# Patient Record
Sex: MALE | Race: WHITE | NOT HISPANIC OR LATINO | Employment: UNEMPLOYED | URBAN - METROPOLITAN AREA
[De-identification: names, ages, dates, MRNs, and addresses within clinical notes are randomized per-mention and may not be internally consistent; named-entity substitution may affect disease eponyms.]

---

## 2017-02-22 PROBLEM — Z86.010 HX OF COLONIC POLYP: Status: ACTIVE | Noted: 2017-02-22

## 2017-02-22 PROBLEM — Z86.0100 HX OF COLONIC POLYP: Status: ACTIVE | Noted: 2017-02-22

## 2017-08-03 PROBLEM — R00.1 BRADYCARDIA: Status: ACTIVE | Noted: 2017-08-03

## 2018-08-20 PROBLEM — H60.90 OTITIS EXTERNA: Status: ACTIVE | Noted: 2018-08-20

## 2019-04-22 PROBLEM — M25.511 CHRONIC PAIN OF BOTH SHOULDERS: Status: ACTIVE | Noted: 2019-04-22

## 2019-04-22 PROBLEM — M25.512 CHRONIC PAIN OF BOTH SHOULDERS: Status: ACTIVE | Noted: 2019-04-22

## 2019-04-22 PROBLEM — G89.29 CHRONIC PAIN OF BOTH SHOULDERS: Status: ACTIVE | Noted: 2019-04-22

## 2019-07-08 PROBLEM — E78.2 MIXED HYPERLIPIDEMIA: Status: ACTIVE | Noted: 2019-07-08

## 2019-07-08 PROBLEM — I10 ESSENTIAL HYPERTENSION: Status: ACTIVE | Noted: 2019-07-08

## 2021-02-18 ENCOUNTER — HOSPITAL ENCOUNTER (OUTPATIENT)
Dept: SLEEP MEDICINE | Facility: HOSPITAL | Age: 56
Discharge: HOME | End: 2021-02-18
Attending: UROLOGY
Payer: MEDICAID

## 2021-02-18 DIAGNOSIS — G47.33 OBSTRUCTIVE SLEEP APNEA (ADULT) (PEDIATRIC): ICD-10-CM

## 2021-02-18 PROCEDURE — 95810 POLYSOM 6/> YRS 4/> PARAM: CPT

## 2021-05-06 ENCOUNTER — PATIENT MESSAGE (OUTPATIENT)
Dept: RESEARCH | Facility: HOSPITAL | Age: 56
End: 2021-05-06

## 2021-07-01 ENCOUNTER — PATIENT MESSAGE (OUTPATIENT)
Dept: ADMINISTRATIVE | Facility: OTHER | Age: 56
End: 2021-07-01

## 2021-12-28 ENCOUNTER — HOSPITAL ENCOUNTER (OUTPATIENT)
Dept: SLEEP MEDICINE | Facility: HOSPITAL | Age: 56
Discharge: HOME | End: 2021-12-28
Attending: STUDENT IN AN ORGANIZED HEALTH CARE EDUCATION/TRAINING PROGRAM
Payer: MEDICAID

## 2021-12-28 DIAGNOSIS — G47.33 OBSTRUCTIVE SLEEP APNEA (ADULT) (PEDIATRIC): ICD-10-CM

## 2021-12-28 PROCEDURE — 95811 POLYSOM 6/>YRS CPAP 4/> PARM: CPT

## 2022-02-15 PROBLEM — E66.3 OVERWEIGHT WITH BODY MASS INDEX (BMI) OF 29 TO 29.9 IN ADULT: Chronic | Status: ACTIVE | Noted: 2022-02-15

## 2022-02-15 PROBLEM — R73.01 IMPAIRED FASTING GLUCOSE: Chronic | Status: ACTIVE | Noted: 2022-02-15

## 2022-02-15 PROBLEM — Z71.3 DIETARY COUNSELING: Chronic | Status: ACTIVE | Noted: 2022-02-15

## 2022-02-15 PROBLEM — D69.6 THROMBOCYTOPENIA: Status: ACTIVE | Noted: 2022-02-15

## 2022-02-15 PROBLEM — E78.2 MIXED HYPERLIPIDEMIA: Chronic | Status: ACTIVE | Noted: 2019-07-08

## 2022-02-15 PROBLEM — H60.90 OTITIS EXTERNA: Status: RESOLVED | Noted: 2018-08-20 | Resolved: 2022-02-15

## 2022-02-15 PROBLEM — Z51.81 THERAPEUTIC DRUG MONITORING: Chronic | Status: ACTIVE | Noted: 2022-02-15

## 2022-02-15 PROBLEM — I10 ESSENTIAL HYPERTENSION: Chronic | Status: ACTIVE | Noted: 2019-07-08

## 2022-02-19 DIAGNOSIS — U07.1 COVID-19 VIRUS DETECTED: ICD-10-CM

## 2022-03-02 PROBLEM — Z86.16 HISTORY OF 2019 NOVEL CORONAVIRUS DISEASE (COVID-19): Chronic | Status: ACTIVE | Noted: 2022-03-02

## 2022-05-05 PROBLEM — E66.811 CLASS 1 OBESITY WITH ALVEOLAR HYPOVENTILATION WITHOUT SERIOUS COMORBIDITY WITH BODY MASS INDEX (BMI) OF 30.0 TO 30.9 IN ADULT: Chronic | Status: ACTIVE | Noted: 2022-02-15

## 2022-05-05 PROBLEM — E66.2 CLASS 1 OBESITY WITH ALVEOLAR HYPOVENTILATION WITHOUT SERIOUS COMORBIDITY WITH BODY MASS INDEX (BMI) OF 30.0 TO 30.9 IN ADULT: Chronic | Status: ACTIVE | Noted: 2022-02-15

## 2022-06-03 PROBLEM — R00.1 BRADYCARDIA: Status: RESOLVED | Noted: 2017-08-03 | Resolved: 2022-06-03

## 2022-06-17 NOTE — PROGRESS NOTES
PA request for myrbetriq sent to plan on 6/15/22. Approved through 6/16/2023 faxed to pharmacy  PA [id number]        [id number] - [place] - [address] AT [facility]  [address]  [place] [address]  Phone: [phone] Fax: [phone]

## 2022-09-06 PROBLEM — D64.9 NORMOCYTIC ANEMIA: Status: ACTIVE | Noted: 2022-09-06

## 2023-03-06 PROBLEM — D64.9 NORMOCYTIC ANEMIA: Chronic | Status: ACTIVE | Noted: 2022-09-06

## 2023-03-06 PROBLEM — D69.6 THROMBOCYTOPENIA: Chronic | Status: ACTIVE | Noted: 2022-02-15

## 2023-06-16 PROBLEM — R35.1 NOCTURIA MORE THAN TWICE PER NIGHT: Status: ACTIVE | Noted: 2023-06-16

## 2023-09-06 PROBLEM — Z71.3 DIETARY COUNSELING: Chronic | Status: RESOLVED | Noted: 2022-02-15 | Resolved: 2023-09-06

## 2023-09-06 PROBLEM — Z51.81 THERAPEUTIC DRUG MONITORING: Chronic | Status: RESOLVED | Noted: 2022-02-15 | Resolved: 2023-09-06

## 2024-04-08 ENCOUNTER — DOCUMENTATION ONLY (OUTPATIENT)
Dept: SLEEP MEDICINE | Facility: HOSPITAL | Age: 59
End: 2024-04-08
Payer: MEDICAID

## 2024-08-20 PROBLEM — K21.9 GASTROESOPHAGEAL REFLUX DISEASE WITHOUT ESOPHAGITIS: Status: ACTIVE | Noted: 2024-08-20

## 2024-08-20 PROBLEM — F41.1 GENERALIZED ANXIETY DISORDER: Status: ACTIVE | Noted: 2024-08-20

## 2025-02-18 PROBLEM — I77.810 AORTIC ROOT DILATATION: Status: ACTIVE | Noted: 2025-02-18

## 2025-08-14 ENCOUNTER — TELEPHONE (OUTPATIENT)
Dept: OTOLARYNGOLOGY | Facility: CLINIC | Age: 60
End: 2025-08-14
Payer: MEDICAID

## 2025-08-14 ENCOUNTER — OFFICE VISIT (OUTPATIENT)
Dept: OTOLARYNGOLOGY | Facility: CLINIC | Age: 60
End: 2025-08-14
Payer: MEDICAID

## 2025-08-14 ENCOUNTER — CLINICAL SUPPORT (OUTPATIENT)
Dept: AUDIOLOGY | Facility: CLINIC | Age: 60
End: 2025-08-14
Payer: MEDICAID

## 2025-08-14 DIAGNOSIS — H72.93 BILATERAL TYMPANIC MEMBRANE PERFORATION: ICD-10-CM

## 2025-08-14 DIAGNOSIS — G47.33 SEVERE OBSTRUCTIVE SLEEP APNEA: Chronic | ICD-10-CM

## 2025-08-14 DIAGNOSIS — H90.6 MIXED CONDUCTIVE AND SENSORINEURAL HEARING LOSS OF BOTH EARS: Primary | ICD-10-CM

## 2025-08-14 DIAGNOSIS — H90.6 MIXED HEARING LOSS, BILATERAL: Primary | ICD-10-CM

## 2025-08-14 PROCEDURE — 92567 TYMPANOMETRY: CPT | Mod: PBBFAC | Performed by: AUDIOLOGIST

## 2025-08-14 PROCEDURE — 99212 OFFICE O/P EST SF 10 MIN: CPT | Mod: PBBFAC,25 | Performed by: OTOLARYNGOLOGY

## 2025-08-14 PROCEDURE — 92557 COMPREHENSIVE HEARING TEST: CPT | Mod: PBBFAC | Performed by: AUDIOLOGIST

## 2025-08-14 PROCEDURE — 4010F ACE/ARB THERAPY RXD/TAKEN: CPT | Mod: CPTII,,, | Performed by: OTOLARYNGOLOGY

## 2025-08-14 PROCEDURE — 99204 OFFICE O/P NEW MOD 45 MIN: CPT | Mod: S$PBB,,, | Performed by: OTOLARYNGOLOGY

## 2025-08-14 PROCEDURE — 3044F HG A1C LEVEL LT 7.0%: CPT | Mod: CPTII,,, | Performed by: OTOLARYNGOLOGY

## 2025-08-14 PROCEDURE — 99999 PR PBB SHADOW E&M-EST. PATIENT-LVL II: CPT | Mod: PBBFAC,,, | Performed by: OTOLARYNGOLOGY

## 2025-08-22 ENCOUNTER — OFFICE VISIT (OUTPATIENT)
Dept: OTOLARYNGOLOGY | Facility: CLINIC | Age: 60
End: 2025-08-22
Payer: MEDICAID

## 2025-08-22 VITALS — DIASTOLIC BLOOD PRESSURE: 82 MMHG | HEART RATE: 59 BPM | SYSTOLIC BLOOD PRESSURE: 123 MMHG

## 2025-08-22 DIAGNOSIS — G47.33 OSA (OBSTRUCTIVE SLEEP APNEA): Primary | ICD-10-CM

## 2025-08-22 DIAGNOSIS — N40.0 BENIGN PROSTATIC HYPERPLASIA, UNSPECIFIED WHETHER LOWER URINARY TRACT SYMPTOMS PRESENT: ICD-10-CM

## 2025-08-22 DIAGNOSIS — J34.89 NASAL OBSTRUCTION: ICD-10-CM

## 2025-08-22 DIAGNOSIS — J35.8 TONSILLOLITH: ICD-10-CM

## 2025-08-27 ENCOUNTER — TELEPHONE (OUTPATIENT)
Dept: OTOLARYNGOLOGY | Facility: CLINIC | Age: 60
End: 2025-08-27
Payer: MEDICAID